# Patient Record
Sex: MALE | URBAN - METROPOLITAN AREA
[De-identification: names, ages, dates, MRNs, and addresses within clinical notes are randomized per-mention and may not be internally consistent; named-entity substitution may affect disease eponyms.]

---

## 2020-03-06 ENCOUNTER — OFFICE VISIT (OUTPATIENT)
Dept: ORTHOPEDIC SURGERY | Age: 35
End: 2020-03-06

## 2020-03-06 VITALS
DIASTOLIC BLOOD PRESSURE: 102 MMHG | HEART RATE: 105 BPM | SYSTOLIC BLOOD PRESSURE: 162 MMHG | OXYGEN SATURATION: 96 % | BODY MASS INDEX: 40.43 KG/M2 | HEIGHT: 74 IN | RESPIRATION RATE: 28 BRPM | WEIGHT: 315 LBS | TEMPERATURE: 98.4 F

## 2020-03-06 DIAGNOSIS — R20.2 ARM PARESTHESIA, LEFT: ICD-10-CM

## 2020-03-06 DIAGNOSIS — M54.2 NECK PAIN: Primary | ICD-10-CM

## 2020-03-06 DIAGNOSIS — M54.50 LOW BACK PAIN AT MULTIPLE SITES: ICD-10-CM

## 2020-03-06 RX ORDER — DULOXETIN HYDROCHLORIDE 30 MG/1
30 CAPSULE, DELAYED RELEASE ORAL DAILY
Qty: 60 CAP | Refills: 1 | Status: SHIPPED | OUTPATIENT
Start: 2020-03-06 | End: 2020-05-20 | Stop reason: ALTCHOICE

## 2020-03-06 RX ORDER — CYCLOBENZAPRINE HCL 5 MG
5 TABLET ORAL
Qty: 45 TAB | Refills: 0 | Status: SHIPPED | OUTPATIENT
Start: 2020-03-06

## 2020-03-06 RX ORDER — AMLODIPINE BESYLATE 5 MG/1
TABLET ORAL
COMMUNITY
Start: 2020-02-24

## 2020-03-06 RX ORDER — ERGOCALCIFEROL 1.25 MG/1
CAPSULE ORAL
COMMUNITY
Start: 2020-02-11

## 2020-03-06 NOTE — PROGRESS NOTES
[address], [address]  [place], [address] [place] [address] Street  Phone: [phone]  Fax: [phone]  NEW PATIENT  Patient: Jerica Cartagena                MRN: [id number]       SSN: [id number]  YOB: 1985        AGE: 30 y.o. SEX: male  Body mass index is 47.22 kg/m². PCP: Riley Whyte NP  03/06/20    Chief Complaint   Patient presents with    Neck Pain    Back Pain    New Patient     ref by Dr. Antony Del Rosario is seen today in consultation at the request of PCP for complaints of Back pain     HISTORY OF PRESENT ILLNESS:  Jerica Cartagena is a 30 y.o.  male with history of multiple complaints, including neck, upper back and low back pain for 15+ years. He reports that he was Dx with Spina Bifida at age 15. He was in the foster care system. He reports neck pain extending into the LUE all the way down, circumferentially w/ tremors in the hand. He denies any true balance issues. He has some dexterity issues with his tremors, his tremors occur with anxiety. He also has Left-sided upper back back, sub scapular that is an achy pain. As far as his low back, he has low back pain around the L5 region radiating to both sides into the hips without any radicular symptoms. He currently denies any leg pain. He reports an episode in 2018 with a 2mo episode of numbness from the waste down, he had extensive work-up thru other providers, none with a diagnosis. His episode resolved with time, with full resolution of his numbness. He has had multiple ER and PCP visits. He had a normal ABD/Pelvic CT at [facility] in January earlier this year. He reports trying Motrin and some muscle relaxers for his pain. He has tried; PT-No,  Non-opioid medications Yes, and spinal injections No. He saw a chiropractor a few years ago with temporary benefit. Pain is aching and throbbing.  Pain is worse with manual/sedentary work, lifting, twisting and affects recreational activities. Pain is better with pain medication     His exam is benign, except a bilateral hand resting tremor and some mild L hip flexor weakness that he reports he has no idea how long it has been there. He has IBS, otherwise normal bladder/bowel dysfunction. Denies bladder/bowel dysfunction, saddle paresthesia, weakness, gait disturbance, or other neurological deficits. Denies chills, fever,night sweats, unexplained weight loss/weight gain, chest pain, sob or anxiety. Pt at this time desires to  continue with current care/proceed with medication evaluation/proceed with evaluation of neck and back pain. Medications OTC Motrin    PMHx: HTN, Obesity    RADIOGRAPHS  4V CS Today  Normal CS   Final interpretation for Dr. Georges Cruz Today  Normal LS  Final interpretation for Dr. Adelso Avery   Diagnoses and all orders for this visit:    1. Neck pain  -     AMB POC XRAY, SPINE, CERVICAL; 4+ VIE  -     REFERRAL TO PHYSICAL THERAPY    2. Arm paresthesia, left  -     AMB POC XRAY, SPINE, CERVICAL; 4+ VIE  -     REFERRAL TO PHYSICAL THERAPY    3. Low back pain at multiple sites  -     AMB POC XRAY, SPINE, LUMBOSACRAL; 4+  -     REFERRAL TO PHYSICAL THERAPY    Other orders  -     DULoxetine (CYMBALTA) 30 mg capsule; Take 1 Cap by mouth daily. Take 1Tab QHS for 2 weeks, then increase to 2tab QHS  -     cyclobenzaprine (FLEXERIL) 5 mg tablet; Take 1 Tab by mouth three (3) times daily as needed for Muscle Spasm(s). IMPRESSION AND PLAN:  This is a pt with multiple complaints, including neck, upper back and low back pain with LUE pain, circumferential w/ tremors w/ anxiety. He has some L hip flexor weakness, probably chronic.    > Pt was given information on Cymbalta   > Trial of Cymbalta  > PT  > PRN Flexeril  > Advised against NSAID use due to BP and f/o with PCP  > Mr. Bruna Schwarz has a reminder for a \"due or due soon\" health maintenance.  I have asked that he contact his primary care provider, Saige Abdi, Raven Ceja NP, for follow-up on this health maintenance.  > We have informed patient to notify us for immediate appointment if he has any worsening neurogical symptoms or if an emergency situation presents, then call 911  >  has been reviewed and is appropriate  > Pt will follow-up in 6 WKS W/ ME. Subjective    Work Doesn't work    Smoking Status quit a yr ago    Pain Scale: 7/10    Pain Assessment  3/6/2020   Location of Pain Back;Neck   Severity of Pain 7   Quality of Pain Aching   Quality of Pain Comment \"numbness,tingling,weakness\"   Frequency of Pain Intermittent   Result of Injury No         REVIEW OF SYSTEMS  Constitutional: Negative for fever, chills, or weight change. Respiratory: Negative for cough or shortness of breath. Cardiovascular: Negative for chest pain or palpitations. Gastrointestinal: Negative for incontinence, acid reflux, change in bowel habits, or constipation. Genitourinary: Negative for incontinence, dysuria and flank pain. Musculoskeletal: Positive for neck, back, LUE pain. See HPI. Skin: Negative for rash. Neurological:LUE pain, bilateral hand tremors, no  radiculopathy. See HPI. Endo/Heme/Allergies: Negative. Psychiatric/Behavioral: Flat affect and anxiety     PHYSICAL EXAMINATION  Visit Vitals  BP (!) 162/102   Pulse (!) 105   Temp 98.4 °F (36.9 °C) (Oral)   Resp 28   Ht 6' 2\" (1.88 m)   Wt (!) 367 lb 12.8 oz (166.8 kg)   SpO2 96%   BMI 47.22 kg/m²         Accompanied by friend. Constitutional:  Well developed, well nourished, in no acute distress. Psychiatric: Affect and mood are appropriate. Integumentary: No rashes or abrasions noted on exposed areas. Cardiovascular/Peripheral Vascular: +2 radial & pedal pulses. No peripheral edema is noted. Lymphatic:  No evidence of lymphedema. No cervical lymphadenopathy. SPINE/MUSCULOSKELETAL EXAM    Cervical spine:  Neck is midline. Normal muscle tone. No focal atrophy is noted.  Neck ROM intact, no pain with flexion, extension, turning right, turning left. Shoulder ROM intact. Tenderness to palpation posterior neck. Negative Spurling's sign. Negative Tinel's sign. Negative Lai's sign. Sensation grossly intact to light touch. Thoracic spine:  Tenderness to palpation L-upper back sub scapular. Lumbar spine:  No rash, ecchymosis, or gross obliquity. No fasciculations. No focal atrophy is noted. Range of motion is intact with flexion, extension. Tenderness to palpation bilateral and mid low back L5, TP. No tenderness to palpation at the sciatic notch. SI joints non-tender. Trochanters non tender. Straight leg raise Neg  Hip Impingement no    Sensation grossly intact to light touch. MOTOR:        Biceps  Triceps Deltoids Wrist Ext Wrist Flex Hand Intrin   Right +4/5 +4/5 +4/5 +4/5 +4/5 +4/5   Left +4/5 +4/5 +4/5 +4/5 +4/5 +4/5      Hip Flex Quads Hamstrings Ankle DF EHL Ankle PF   Right +4/5 +4/5 +4/5 +4/5 +4/5 +4/5   Left -4/5 +4/5 +4/5 +4/5 +4/5 +4/5         DTRs are 1+ biceps, triceps, brachioradialis, patella, and Achilles. Ambulation without assistive device. FWB.    normal gait and station        PAST MEDICAL HISTORY   No past medical history on file. History reviewed. No pertinent surgical history. Jt Bermeo MEDICATIONS      Current Outpatient Medications   Medication Sig Dispense Refill    amLODIPine (NORVASC) 5 mg tablet TAKE 1 TABLET BY MOUTH ONCE DAILY FOR BLOOD PRESSURE      VITAMIN D2 1,250 mcg (50,000 unit) capsule three (3) days a week.  DULoxetine (CYMBALTA) 30 mg capsule Take 1 Cap by mouth daily. Take 1Tab QHS for 2 weeks, then increase to 2tab QHS 60 Cap 1    cyclobenzaprine (FLEXERIL) 5 mg tablet Take 1 Tab by mouth three (3) times daily as needed for Muscle Spasm(s).  39 Tab 0        ALLERGIES  No Known Allergies       SOCIAL HISTORY    Social History     Socioeconomic History    Marital status: SINGLE     Spouse name: Not on file    Number of children: Not on file    Years of education: Not on file    Highest education level: Not on file   Occupational History    Not on file   Social Needs    Financial resource strain: Not on file    Food insecurity:     Worry: Not on file     Inability: Not on file    Transportation needs:     Medical: Not on file     Non-medical: Not on file   Tobacco Use    Smoking status: Not on file   Substance and Sexual Activity    Alcohol use: Not on file    Drug use: Not on file    Sexual activity: Not on file   Lifestyle    Physical activity:     Days per week: Not on file     Minutes per session: Not on file    Stress: Not on file   Relationships    Social connections:     Talks on phone: Not on file     Gets together: Not on file     Attends Orthodox service: Not on file     Active member of club or organization: Not on file     Attends meetings of clubs or organizations: Not on file     Relationship status: Not on file    Intimate partner violence:     Fear of current or ex partner: Not on file     Emotionally abused: Not on file     Physically abused: Not on file     Forced sexual activity: Not on file   Other Topics Concern    Not on file   Social History Narrative    Not on file       FAMILY HISTORY  No family history on file.       Irasema Espinoza, NP

## 2020-05-20 ENCOUNTER — VIRTUAL VISIT (OUTPATIENT)
Dept: ORTHOPEDIC SURGERY | Age: 35
End: 2020-05-20

## 2020-05-20 DIAGNOSIS — M54.50 LOW BACK PAIN AT MULTIPLE SITES: Primary | ICD-10-CM

## 2020-05-20 DIAGNOSIS — M54.2 NECK PAIN: ICD-10-CM

## 2020-05-20 RX ORDER — DULOXETIN HYDROCHLORIDE 60 MG/1
60 CAPSULE, DELAYED RELEASE ORAL DAILY
Qty: 90 CAP | Refills: 1 | Status: SHIPPED | OUTPATIENT
Start: 2020-05-20

## 2020-05-20 NOTE — PROGRESS NOTES
SHABBIR  Rosendo Madden is a 37 y.o. male who was seen by synchronous (real-time) audio-video technology, patient's location HOME, provider's location [facility]  with the patient's verbal consent-with the understanding that charges maybe billed for the visit. This visit was conducted using the doxy. me platform on 5/20/2020 for     Chief Complaint   Patient presents with    Neck Pain     Virtual Visit       Additional Participants during visit: susan Madden is a 37 y.o.  male with history of multiple complaints, including neck, upper back and low back pain for 15+ years. He reports that he was Dx with Spina Bifida at age 15. He was in the foster care system. He reports neck pain extending into the LUE all the way down, circumferentially w/ tremors in the hand. He denies any true balance issues. He has some dexterity issues with his tremors, his tremors occur with anxiety. He also has Left-sided upper back back, sub scapular that is an achy pain. As far as his low back, he has low back pain around the L5 region radiating to both sides into the hips without any radicular symptoms. He currently denies any leg pain. He reports an episode in 2018 with a 2mo episode of numbness from the waste down, he had extensive work-up thru other providers, none with a diagnosis. His episode resolved with time, with full resolution of his numbness. He has had multiple ER and PCP visits. He had a normal ABD/Pelvic CT at [facility] in January earlier this year. He reports trying Motrin and some muscle relaxers for his pain. He saw a chiropractor a few years ago with temporary benefit. Pain is aching and throbbing. Pain is worse with manual/sedentary work, lifting, twisting and affects recreational activities. Pain is better with pain medication     I saw him as a new pt about 2-3mo ago and gave him a trial of Cymbalta and referred him to PT.  Today, he reports that he is pain free since being on the Cymbalta unless he over-does it with his activities. He has been able to exercise and lose some weight. It is also helping with his anxiety and depression. He feels great.      Denies bladder/bowel dysfunction, saddle paresthesia, weakness, gait disturbance, or other neurological deficits. Denies chills, fever,night sweats, unexplained weight loss/weight gain, chest pain, sob or anxiety. Pt at this time desires to  continue with current care/proceed with medication evaluation/proceed with evaluation of neck and back pain.     Medications Cymbalta 60mg, OTC Motrin     PMHx: HTN, Obesity    Assessment & Plan:   Diagnoses and all orders for this visit:    1. Low back pain at multiple sites    2. Neck pain    Other orders  -     DULoxetine (CYMBALTA) 60 mg capsule; Take 1 Cap by mouth daily. This is a pt with neck and back pain w/ paresthesias relieved w/ Cymbalta, stable on his current dose and doing well.      > Pt was given information on Cymbalta   > Continue Cymbalta  > Cont HEP and wgt loss  > Pt will f/o in 6 mo w/me  > Mr. Rahul Nair has a reminder for a \"due or due soon\" health maintenance. I have asked that he contact his primary care provider, Ivet Mueller NP, for follow-up on this health maintenance.  > We have informed patient to notify us for immediate appointment if he has any worsening neurogical symptoms or if an emergency situation presents, then call 911  >  has been reviewed and is appropriate        CPT Codes 44143-17218 for Established Patients may apply to this Telehealth Visit  Time-based coding, delete if not needed: I spent at least 15 minutes with this established patient, and >50% of the time was spent counseling and/or coordinating care regarding neck and back pain  Start Time: 0805  End Time: 0818    Subjective:   Axel Pallas was seen for Neck Pain (Virtual Visit)      Prior to Admission medications    Medication Sig Start Date End Date Taking?  Authorizing Provider   DULoxetine (CYMBALTA) 60 mg capsule Take 1 Cap by mouth daily. 5/20/20  Yes Justin Negrete NP   amLODIPine (NORVASC) 5 mg tablet TAKE 1 TABLET BY MOUTH ONCE DAILY FOR BLOOD PRESSURE 2/24/20  Yes Provider, Historical   VITAMIN D2 1,250 mcg (50,000 unit) capsule three (3) days a week. 2/11/20  Yes Provider, Historical   cyclobenzaprine (FLEXERIL) 5 mg tablet Take 1 Tab by mouth three (3) times daily as needed for Muscle Spasm(s). 3/6/20  Yes Justin Negrete NP   DULoxetine (CYMBALTA) 30 mg capsule Take 1 Cap by mouth daily. Take 1Tab QHS for 2 weeks, then increase to 2tab QHS 3/6/20 5/20/20  Justin Negrete NP     No Known Allergies      There is no problem list on file for this patient. There are no active problems to display for this patient. Current Outpatient Medications   Medication Sig Dispense Refill    DULoxetine (CYMBALTA) 60 mg capsule Take 1 Cap by mouth daily. 90 Cap 1    amLODIPine (NORVASC) 5 mg tablet TAKE 1 TABLET BY MOUTH ONCE DAILY FOR BLOOD PRESSURE      VITAMIN D2 1,250 mcg (50,000 unit) capsule three (3) days a week.  cyclobenzaprine (FLEXERIL) 5 mg tablet Take 1 Tab by mouth three (3) times daily as needed for Muscle Spasm(s). 45 Tab 0     No Known Allergies  History reviewed. No pertinent past medical history. History reviewed. No pertinent surgical history. ROS  REVIEW OF SYSTEMS  Constitutional: Negative for fever, chills, or weight change. Respiratory: Negative for cough or shortness of breath. Cardiovascular: Negative for chest pain or palpitations. Gastrointestinal: Negative for incontinence, acid reflux, change in bowel habits, or constipation. Genitourinary: Negative for incontinence, dysuria and flank pain. Musculoskeletal: Positive for no pain. See HPI. Skin: Negative for rash. Neurological:no  radiculopathy. See HPI. Endo/Heme/Allergies: Negative. Psychiatric/Behavioral: Negative.         Objective:     General: alert, cooperative, no distress   Mental  status: mental status: alert, oriented to person, place, and time, normal mood, behavior, speech, dress, motor activity, and thought processes   Resp: resp: normal effort and no respiratory distress   Neuro: neuro: no gross deficits   Skin: skin: no discoloration or lesions of concern on visible areas     Due to this being a TeleHealth evaluation, many elements of the physical examination are unable to be assessed. We discussed the expected course, resolution and complications of the diagnosis(es) in detail. Medication risks, benefits, costs, interactions, and alternatives were discussed as indicated. I advised him to contact the office if his condition worsens, changes or fails to improve as anticipated. He expressed understanding with the diagnosis(es) and plan. Pursuant to the emergency declaration under the [facility], [facility] waiver authority and the [employer] Act, this Virtual  Visit was conducted, with patient's consent, to reduce the patient's risk of exposure to COVID-19 and provide continuity of care for an established patient. Services were provided through a video synchronous discussion virtually to substitute for in-person clinic visit.     Roseanne Gatica NP

## 2020-05-20 NOTE — PATIENT INSTRUCTIONS
Duloxetine (By mouth) Duloxetine (doo-LOX-[id number]-teen) Treats depression, anxiety, diabetic peripheral neuropathy, fibromyalgia, and chronic muscle or bone pain. This medicine is an SSNRI. Brand Name(s): Cymbalta, DermacinRx STANLEY Young There may be other brand names for this medicine. When This Medicine Should Not Be Used: This medicine is not right for everyone. Do not use it if you had an allergic reaction to duloxetine. How to Use This Medicine:  
Capsule, Delayed Release Capsule · Take your medicine as directed. Your dose may need to be changed several times to find what works best for you. · Delayed-release capsule: Swallow the capsule whole. Do not crush, chew, break, or open it. · This medicine should come with a Medication Guide. Ask your pharmacist for a copy if you do not have one. · Missed dose: Take a dose as soon as you remember. If it is almost time for your next dose, wait until then and take a regular dose. Do not take extra medicine to make up for a missed dose. · Store the medicine in a closed container at room temperature, away from heat, moisture, and direct light. Drugs and Foods to Avoid: Ask your doctor or pharmacist before using any other medicine, including over-the-counter medicines, vitamins, and herbal products. · Do not take duloxetine if you have used an MAO inhibitor (MAOI) within the past 14 days. Do not start taking an MAO inhibitor within 5 days of stopping duloxetine. · Some medicines can affect how duloxetine works. Tell your doctor if you are using any of the following: 
¨ Buspirone, cimetidine, ciprofloxacin, enoxacin, fentanyl, lithium, Jessica's wort, theophylline, tramadol, tryptophan, or warfarin ¨ Amphetamines ¨ Blood pressure medicine ¨ Diuretic (water pill) ¨ Medicine for heart rhythm problems (including flecainide, propafenone, quinidine) ¨ Medicine to treat migraine headaches (including triptans) ¨ NSAID pain or arthritis medicine (including aspirin, celecoxib, diclofenac, ibuprofen, naproxen) ¨ Other medicine to treat depression or mood disorders (including amitriptyline, desipramine, fluoxetine, imipramine, nortriptyline, paroxetine) ¨ Phenothiazine medicine (including thioridazine) · Tell your doctor if you use anything else that makes you sleepy. Some examples are allergy medicine, narcotic pain medicine, and alcohol. · Do not drink alcohol while you are using this medicine. Warnings While Using This Medicine: · Tell your doctor if you are pregnant or breastfeeding, or if you have kidney disease, liver disease, diabetes, digestion problems, glaucoma, heart disease, high or low blood pressure, or problems with urination. Tell your doctor if you smoke or you have a history of seizures, or drug or alcohol addiction. · This medicine may cause the following problems:  
¨ Serious liver problems ¨ Serotonin syndrome (more likely when used with certain other medicines) ¨ Increased risk of bleeding problems ¨ Serious skin reactions ¨ Low sodium levels in the blood · This medicine can increase thoughts of suicide. Tell your doctor right away if you start to feel depressed and have thoughts about hurting yourself. · This medicine can cause changes in your blood pressure. This may make you dizzy or drowsy. Do not drive or do anything that could be dangerous until you know how this medicine affects you. Stand up slowly to avoid falls. · Do not stop using this medicine suddenly. Your doctor will need to slowly decrease your dose before you stop it completely. · Your doctor will check your progress and the effects of this medicine at regular visits. Keep all appointments. · Keep all medicine out of the reach of children. Never share your medicine with anyone. Possible Side Effects While Using This Medicine:  
Call your doctor right away if you notice any of these side effects: · Allergic reaction: Itching or hives, swelling in your face or hands, swelling or tingling in your mouth or throat, chest tightness, trouble breathing · Anxiety, restlessness, fever, fast heartbeat, sweating, muscle spasms, diarrhea, seeing or hearing things that are not there · Blistering, peeling, red skin rash · Confusion, weakness, muscle twitching · Dark urine or pale stools, nausea, vomiting, loss of appetite, stomach pain, yellow skin or eyes · Decrease in how much or how often you urinate · Eye pain, vision changes, seeing halos around lights · Feeling more energetic than usual 
· Lightheadedness, dizziness, or fainting · Unusual moods or behaviors, worsening depression, thoughts about hurting yourself, trouble sleeping · Unusual bleeding or bruising If you notice these less serious side effects, talk with your doctor: · Decrease in appetite or weight · Dry mouth, constipation, mild nausea · Unusual drowsiness, sleepiness, or tiredness If you notice other side effects that you think are caused by this medicine, tell your doctor. Call your doctor for medical advice about side effects. You may report side effects to FDA at 5-277-FDA-1197 © 2017 [facility] Information is for End User's use only and may not be sold, redistributed or otherwise used for commercial purposes. The above information is an  only. It is not intended as medical advice for individual conditions or treatments. Talk to your doctor, nurse or pharmacist before following any medical regimen to see if it is safe and effective for you.